# Patient Record
Sex: FEMALE | URBAN - METROPOLITAN AREA
[De-identification: names, ages, dates, MRNs, and addresses within clinical notes are randomized per-mention and may not be internally consistent; named-entity substitution may affect disease eponyms.]

---

## 2019-01-03 ENCOUNTER — OFFICE VISIT (OUTPATIENT)
Dept: URBAN - METROPOLITAN AREA CLINIC 88 | Facility: CLINIC | Age: 49
End: 2019-01-03
Payer: COMMERCIAL

## 2019-01-03 DIAGNOSIS — E11.9 TYPE 2 DIABETES MELLITUS WITHOUT COMPLICATIONS: Primary | ICD-10-CM

## 2019-01-03 PROCEDURE — 99214 OFFICE O/P EST MOD 30 MIN: CPT | Performed by: OPHTHALMOLOGY

## 2019-01-03 ASSESSMENT — KERATOMETRY
OS: 42.88
OD: 43.00

## 2019-01-03 ASSESSMENT — INTRAOCULAR PRESSURE
OD: 17
OS: 17

## 2019-01-03 NOTE — IMPRESSION/PLAN
Impression: Type 2 diabetes mellitus without complications: G20.8. Condition: established, stable. Symptoms: will continue to monitor. Plan: Diabetes type II: no background retinopathy, no signs of neovascularization noted. Discussed ocular and systemic benefits of blood sugar control.

## 2019-01-03 NOTE — IMPRESSION/PLAN
Impression: Open angle with borderline findings, low risk, bilateral: H40.013. OU. Condition: unstable. Plan: Intraocular pressure stable with no diagnosis of glaucoma. Will not start medication and will continue to observe. Diagnosis explained and patient verbalized understanding.

## 2019-01-03 NOTE — IMPRESSION/PLAN
Impression: Other vitreous opacities, bilateral: H43.393. OU. Condition: established, stable. Plan: Discussed signs and symptoms of PVD/floaters. Patient instructed to call the office immediately if any symptoms noted.

## 2019-01-14 ENCOUNTER — OFFICE VISIT (OUTPATIENT)
Dept: URBAN - METROPOLITAN AREA CLINIC 88 | Facility: CLINIC | Age: 49
End: 2019-01-14
Payer: COMMERCIAL

## 2019-01-14 PROCEDURE — 99213 OFFICE O/P EST LOW 20 MIN: CPT | Performed by: OPHTHALMOLOGY

## 2019-01-14 PROCEDURE — 92083 EXTENDED VISUAL FIELD XM: CPT | Performed by: OPHTHALMOLOGY

## 2019-01-14 PROCEDURE — 76514 ECHO EXAM OF EYE THICKNESS: CPT | Performed by: OPHTHALMOLOGY

## 2019-01-14 ASSESSMENT — INTRAOCULAR PRESSURE
OD: 19
OS: 19

## 2019-01-14 NOTE — IMPRESSION/PLAN
Impression: Open angle with borderline findings, low risk, bilateral: H40.013. OU. Condition: established, stable. Symptoms: IOP is stable. Plan: Intraocular pressure stable with no diagnosis of glaucoma. Will not start medication and will continue to observe. Diagnosis explained and patient verbalized understanding.

## 2019-08-06 ENCOUNTER — OFFICE VISIT (OUTPATIENT)
Dept: URBAN - METROPOLITAN AREA CLINIC 88 | Facility: CLINIC | Age: 49
End: 2019-08-06
Payer: COMMERCIAL

## 2019-08-06 PROCEDURE — 99212 OFFICE O/P EST SF 10 MIN: CPT | Performed by: OPHTHALMOLOGY

## 2019-08-06 ASSESSMENT — INTRAOCULAR PRESSURE
OD: 19
OS: 20

## 2019-08-06 NOTE — IMPRESSION/PLAN
Impression: Open angle with borderline findings, low risk, bilateral: H40.013. OU. Condition: established, stable. Vision: vision not affected. Plan: Advised patient of condition. IOP stable. No eye pressure drops needed at this time. Will continue to monitor.

## 2019-08-06 NOTE — IMPRESSION/PLAN
Impression: Type 2 diabetes mellitus without complications: Q24.6. Condition: established, stable. Plan: Discussed diagnosis in detail with patient. Emphasized blood sugar control. Keep future appts with PCP.

## 2020-02-13 ENCOUNTER — OFFICE VISIT (OUTPATIENT)
Dept: URBAN - METROPOLITAN AREA CLINIC 88 | Facility: CLINIC | Age: 50
End: 2020-02-13
Payer: COMMERCIAL

## 2020-02-13 DIAGNOSIS — H04.123 DRY EYE SYNDROME OF BILATERAL LACRIMAL GLANDS: ICD-10-CM

## 2020-02-13 PROCEDURE — 99214 OFFICE O/P EST MOD 30 MIN: CPT | Performed by: OPHTHALMOLOGY

## 2020-02-13 ASSESSMENT — INTRAOCULAR PRESSURE
OS: 20
OD: 19

## 2020-02-13 ASSESSMENT — VISUAL ACUITY
OD: 20/20
OS: 20/20

## 2020-02-13 NOTE — IMPRESSION/PLAN
Impression: Dry eye syndrome of bilateral lacrimal glands: H04.123. Condition: established, stable. Plan: Discussed diagnosis in detail with patient. Explained condition does not have a cure and will need artificial tears for maintenance.

## 2020-02-13 NOTE — IMPRESSION/PLAN
Impression: Other vitreous opacities, bilateral: H43.393 OU. Plan: Discussed signs and symptoms of PVD/floaters. Patient instructed to call the office immediately if any symptoms noted.

## 2020-02-13 NOTE — IMPRESSION/PLAN
Impression: Type 2 diabetes mellitus without complications: H88.8. Condition: established, stable. Plan: Diabetes type II: no background retinopathy, no signs of neovascularization noted. Discussed ocular and systemic benefits of blood sugar control.

## 2021-02-18 ENCOUNTER — OFFICE VISIT (OUTPATIENT)
Dept: URBAN - METROPOLITAN AREA CLINIC 88 | Facility: CLINIC | Age: 51
End: 2021-02-18
Payer: COMMERCIAL

## 2021-02-18 DIAGNOSIS — H43.393 OTHER VITREOUS OPACITIES, BILATERAL: ICD-10-CM

## 2021-02-18 DIAGNOSIS — H40.013 OPEN ANGLE WITH BORDERLINE FINDINGS, LOW RISK, BILATERAL: ICD-10-CM

## 2021-02-18 PROCEDURE — 99214 OFFICE O/P EST MOD 30 MIN: CPT | Performed by: OPHTHALMOLOGY

## 2021-02-18 PROCEDURE — 92133 CPTRZD OPH DX IMG PST SGM ON: CPT | Performed by: OPHTHALMOLOGY

## 2021-02-18 ASSESSMENT — INTRAOCULAR PRESSURE
OD: 20
OS: 20

## 2021-02-18 NOTE — IMPRESSION/PLAN
Impression: Type 2 diabetes mellitus without complications: C92.9. Condition: established, stable. Plan: Diabetes type II: no background retinopathy, no signs of neovascularization noted. Discussed ocular and systemic benefits of blood sugar control.

## 2022-02-21 ENCOUNTER — OFFICE VISIT (OUTPATIENT)
Dept: URBAN - METROPOLITAN AREA CLINIC 88 | Facility: CLINIC | Age: 52
End: 2022-02-21
Payer: COMMERCIAL

## 2022-02-21 DIAGNOSIS — E11.3293 TYPE 2 DIAB WITH MILD NONP RTNOP WITHOUT MACULAR EDEMA, BI: Primary | ICD-10-CM

## 2022-02-21 PROCEDURE — 92014 COMPRE OPH EXAM EST PT 1/>: CPT | Performed by: OPHTHALMOLOGY

## 2022-02-21 RX ORDER — AMLODIPINE BESYLATE 10 MG/1
10 MG TABLET ORAL
Qty: 0 | Refills: 0 | Status: ACTIVE
Start: 2022-02-21

## 2022-02-21 RX ORDER — LISINOPRIL 40 MG/1
40 MG TABLET ORAL
Qty: 0 | Refills: 0 | Status: ACTIVE
Start: 2022-02-21

## 2022-02-21 ASSESSMENT — INTRAOCULAR PRESSURE
OS: 19
OD: 19